# Patient Record
Sex: FEMALE | Race: AMERICAN INDIAN OR ALASKA NATIVE
[De-identification: names, ages, dates, MRNs, and addresses within clinical notes are randomized per-mention and may not be internally consistent; named-entity substitution may affect disease eponyms.]

---

## 2019-01-03 ENCOUNTER — HOSPITAL ENCOUNTER (INPATIENT)
Dept: HOSPITAL 31 - C.ER | Age: 39
LOS: 4 days | Discharge: HOME | DRG: 751 | End: 2019-01-07
Attending: INTERNAL MEDICINE | Admitting: INTERNAL MEDICINE
Payer: MEDICAID

## 2019-01-03 DIAGNOSIS — E78.5: ICD-10-CM

## 2019-01-03 DIAGNOSIS — R55: ICD-10-CM

## 2019-01-03 DIAGNOSIS — F10.230: Primary | ICD-10-CM

## 2019-01-03 DIAGNOSIS — Z79.899: ICD-10-CM

## 2019-01-03 DIAGNOSIS — I10: ICD-10-CM
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
## 2019-01-03 DIAGNOSIS — Z98.891: ICD-10-CM

## 2019-01-03 LAB
ALBUMIN SERPL-MCNC: 4.7 G/DL (ref 3.5–5)
ALBUMIN/GLOB SERPL: 1.3 {RATIO} (ref 1–2.1)
ALT SERPL-CCNC: 45 U/L (ref 9–52)
ANISOCYTOSIS BLD QL SMEAR: SLIGHT
AST SERPL-CCNC: 90 U/L (ref 14–36)
BASOPHILS # BLD AUTO: 0 K/UL (ref 0–0.2)
BASOPHILS NFR BLD: 0.6 % (ref 0–2)
BUN SERPL-MCNC: 5 MG/DL (ref 7–17)
CALCIUM SERPL-MCNC: 8.7 MG/DL (ref 8.6–10.4)
EOSINOPHIL # BLD AUTO: 0 K/UL (ref 0–0.7)
EOSINOPHIL NFR BLD: 0.1 % (ref 0–4)
ERYTHROCYTE [DISTWIDTH] IN BLOOD BY AUTOMATED COUNT: 21.3 % (ref 11.5–14.5)
GFR NON-AFRICAN AMERICAN: > 60
HGB BLD-MCNC: 10.1 G/DL (ref 11–16)
HYPOCHROMIC: (no result)
LIPASE: 114 U/L (ref 23–300)
LYMPHOCYTE: 4 % (ref 20–40)
LYMPHOCYTES # BLD AUTO: 0.7 K/UL (ref 1–4.3)
LYMPHOCYTES NFR BLD AUTO: 9.3 % (ref 20–40)
MCH RBC QN AUTO: 24.2 PG (ref 27–31)
MCHC RBC AUTO-ENTMCNC: 31 G/DL (ref 33–37)
MCV RBC AUTO: 78.2 FL (ref 81–99)
MICROCYTES BLD QL SMEAR: SLIGHT
MONOCYTE: 3 % (ref 0–10)
MONOCYTES # BLD: 0.5 K/UL (ref 0–0.8)
MONOCYTES NFR BLD: 6.1 % (ref 0–10)
NEUTROPHILS # BLD: 6.5 K/UL (ref 1.8–7)
NEUTROPHILS NFR BLD AUTO: 83.9 % (ref 50–75)
NEUTROPHILS NFR BLD AUTO: 93 % (ref 50–75)
NRBC BLD AUTO-RTO: 0 % (ref 0–2)
PLATELET # BLD EST: NORMAL 10*3/UL
PLATELET # BLD: 253 K/UL (ref 130–400)
PMV BLD AUTO: 10.1 FL (ref 7.2–11.7)
POLYCHROMIC: SLIGHT
RBC # BLD AUTO: 4.17 MIL/UL (ref 3.8–5.2)
TOTAL CELLS COUNTED BLD: 100
WBC # BLD AUTO: 7.8 K/UL (ref 4.8–10.8)

## 2019-01-03 NOTE — RAD
HISTORY:

 syncope 



COMPARISON:

Chest x-ray performed 6/13/15 



TECHNIQUE:

Chest PA and lateral



FINDINGS:





LUNGS:

No focal consolidation.



Please note that chest x-ray has limited sensitivity for the 

detection of pulmonary masses.



PLEURA:

No significant pleural effusion identified. No definite pneumothorax .



CARDIOVASCULAR:

Heart size appears within normal limits.  No atherosclerotic 

calcification present.



OSSEOUS STRUCTURES:

No acute osseous abnormality identified.



VISUALIZED UPPER ABDOMEN:

Unremarkable.



OTHER FINDINGS:

None.



IMPRESSION:

No focal consolidation.

## 2019-01-03 NOTE — C.PDOC
History Of Present Illness


 38 yr old female w/ hx of htn, hld, etoh abuse p/w syncopal epsiode. Pt notes 

that she was working as a  when she felt dizzy and passed out. She

notes that she has been dizzy at work, and that the last time she drank was 

yesterday. She notes drinking 6+ cans of beer a day. She denies biting her 

tongue or enuresis and denies any hx of etoh withdrawal seizures. No fever, 

chills or night sweats. 





Time Seen by Provider: 19 16:58


Chief Complaint (Nursing): GI Problem





Past Medical History


Vital Signs: 





                                Last Vital Signs











Temp  98.2 F   19 16:15


 


Pulse  85   19 16:41


 


Resp  16   19 16:41


 


BP  199/99 H  19 16:41


 


Pulse Ox  100   19 16:41














- Medical History


PMH: Cardia Arrhythmia, HTN


   Denies: Chronic Kidney Disease


Family History: States: Unknown Family Hx





- Social History


Hx Tobacco Use: No


Hx Alcohol Use: Yes


Hx Substance Use: No





- Immunization History


Hx Tetanus Toxoid Vaccination: Yes ()


Hx Influenza Vaccination: Yes


Hx Pneumococcal Vaccination: No





Review Of Systems


Constitutional: Positive for: Chills.  Negative for: Fever


Eyes: Negative for: Pain, Vision Change, Eyelid Inflammation, Redness


ENT: Negative for: Ear Pain, Ear Discharge, Nose Congestion, Mouth Pain


Cardiovascular: Negative for: Chest Pain, Palpitations, Edema


Respiratory: Negative for: Cough, Shortness of Breath, SOB with Excertion, 

Pleuritic Pain


Gastrointestinal: Negative for: Nausea, Vomiting, Abdominal Pain, Diarrhea, 

Constipation


Genitourinary: Negative for: Dysuria


Musculoskeletal: Negative for: Neck Pain, Shoulder Pain, Back Pain, Foot Pain


Psych: Positive for: Anxiety





Physical Exam





- Physical Exam


Appears: Well, Non-toxic


Skin: Normal Color, Warm


Head: Atraumatic, Normacephalic


Eye(s): bilateral: Normal Inspection, PERRL, EOMI


Ear(s): Bilateral: Normal


Nose: Normal


Tongue: Normal Appearing


Lips: Normal Appearing


Teeth: Normal Dentition


Throat: Normal, No Erythema, No Exudate


Neck: Normal, Normal ROM, Other (no meningeal signs)


Chest: Symmetrical, No Deformity


Cardiovascular: Rhythm Regular


Respiratory: Normal Breath Sounds, No Rales, No Rhonchi


Gastrointestinal/Abdominal: Normal Exam, Soft, No Tenderness


Back: Normal Inspection, No CVA Tenderness, No Vertebral Tenderness


Extremity: Normal ROM, No Tenderness


Extremity: Bilateral: Atraumatic, Normal Color And Temperature, Normal ROM, 

Pelvis-Stable


Pulses: Left Dorsalis Pedis: Normal, Right Dorsalis Pedis: Normal


Neurological/Psych: Oriented x3, Normal Speech, Normal Cognition


Other Neurological Findings: Other (tongue fasisculations, tremors noted on 

exams to distal fingertips)


Extremity: Right: No Drift, Left: No Drift





ED Course And Treatment





- Laboratory Results


Result Diagrams: 


                                 19 17:28





                                 19 17:28


O2 Sat by Pulse Oximetry: 100





Medical Decision Making


Medical Decision Makin yr old F w/ hx of Etoh abuse p/w syncopal episodes, nausea, tremors. Pt  

notes symptoms began after she d/c etoh last night. No hx of withdrawal or 

seizures but has never d/c etoh. Likely etoh withdrawal w/ syncopal episode. No 

signs of trauma or meningeal signs on exam. No dark or bloody stool. 





CIWA 15





EK, NSR, no stemi





1757


Given home medications for HTN, with improvement, pt denies any HA.


Ct read per my red is neg for ICH


improved shakes and nervousness and fascilations w/ ativan here in ED


admitted to Dr. Gotti (PMD is david) for etoh withdrawal


Pt agreeable to plan 





Disposition





- Disposition


Disposition Time: 17:56


Condition: GOOD


Forms:  [facility] (English)





- Clinical Impression


Clinical Impression: 


 Alcohol withdrawal

## 2019-01-03 NOTE — CT
Date of service: 01/03/2019



PROCEDURE:  CT HEAD WITHOUT CONTRAST.



HISTORY:

syncope



COMPARISON:

Noncontrast head CT performed 3/29/16



TECHNIQUE:

Axial computed tomography images were obtained through the head/brain 

without intravenous contrast.  



Radiation dose:



Total exam DLP = 1109.92 mGy-cm.



This CT exam was performed using one or more of the following dose 

reduction techniques: Automated exposure control, adjustment of the 

mA and/or kV according to patient size, and/or use of iterative 

reconstruction technique.



FINDINGS:



HEMORRHAGE:

No intracranial hemorrhage. 



BRAIN:

No mass effect or edema.  Intracranial atherosclerosis.  Mild 

scattered white matter hypodensities, which are nonspecific, but 

often seen with chronic microvascular ischemic disease. Please note 

that MRI with diffusion imaging is more sensitive in the detection of 

acute ischemic event.



VENTRICLES:

No hydrocephalus. 



CALVARIUM:

Unremarkable.



PARANASAL SINUSES:

Unremarkable as visualized. No significant inflammatory changes.



MASTOID AIR CELLS:

Unremarkable as visualized. No inflammatory changes.



OTHER FINDINGS:

None.



IMPRESSION:

Mild scattered white matter hypodensities, which are nonspecific, but 

often seen with chronic microvascular ischemic disease.  Correlate 

clinically.

## 2019-01-03 NOTE — CT
Date of service: 01/03/2019



CT cervical spine without IV contrast 



Indication: fall



Comparison: None available 



Technique: 



Axial computed tomography images were obtained of the cervical spine 

without the use of intravenous contrast. Coronal and sagittal 

reformatted images were created and reviewed.



This CT exam was performed using 1 or more of the following dose 

reduction techniques: Automated exposure control, adjustment of the 

MAA and/or kV according to patient size, and/or use of iterative 

reconstruction technique. 



Radiation dose:



Total exam DLP = 587.89 mGy-cm.



Findings: 



Straightening of the normal cervical lordosis may be related to 

muscle spasm or positioning. There is no evidence of acute fracture 

or subluxation.  There is preserved alignment, vertebral body height, 

intervertebral disc spaces.  The prevertebral soft tissues and 

spinolaminar lines appear intact. The lateral masses are preserved.  

The dens tip is intact.  There is proper alignment of the lateral 

masses of C1 with the C2 vertebral body. 



Included portions of the thyroid gland appear unremarkable.  Included 

portions of lung apices appear clear. Mucosal thickening of the 

ethmoid air cells and maxillary sinuses. 



Impression: 



Straightening of the normal cervical lordosis may be related to 

muscle spasm or positioning. 



No evidence of acute fracture or subluxation. 



Mucosal thickening of the included portions ethmoid air cells and 

maxillary sinuses.

## 2019-01-04 LAB
ALBUMIN SERPL-MCNC: 4.4 G/DL (ref 3.5–5)
ALBUMIN/GLOB SERPL: 1.2 {RATIO} (ref 1–2.1)
ALT SERPL-CCNC: 42 U/L (ref 9–52)
AST SERPL-CCNC: 63 U/L (ref 14–36)
BASOPHILS # BLD AUTO: 0 K/UL (ref 0–0.2)
BASOPHILS NFR BLD: 0.8 % (ref 0–2)
BUN SERPL-MCNC: 8 MG/DL (ref 7–17)
CALCIUM SERPL-MCNC: 8.9 MG/DL (ref 8.6–10.4)
EOSINOPHIL # BLD AUTO: 0 K/UL (ref 0–0.7)
EOSINOPHIL NFR BLD: 0.2 % (ref 0–4)
ERYTHROCYTE [DISTWIDTH] IN BLOOD BY AUTOMATED COUNT: 21.1 % (ref 11.5–14.5)
GFR NON-AFRICAN AMERICAN: > 60
HGB BLD-MCNC: 9.9 G/DL (ref 11–16)
LYMPHOCYTES # BLD AUTO: 0.8 K/UL (ref 1–4.3)
LYMPHOCYTES NFR BLD AUTO: 16.4 % (ref 20–40)
MCH RBC QN AUTO: 24.8 PG (ref 27–31)
MCHC RBC AUTO-ENTMCNC: 31.3 G/DL (ref 33–37)
MCV RBC AUTO: 79 FL (ref 81–99)
MONOCYTES # BLD: 0.3 K/UL (ref 0–0.8)
MONOCYTES NFR BLD: 6.7 % (ref 0–10)
NEUTROPHILS # BLD: 3.9 K/UL (ref 1.8–7)
NEUTROPHILS NFR BLD AUTO: 75.9 % (ref 50–75)
NRBC BLD AUTO-RTO: 0 % (ref 0–2)
PLATELET # BLD: 236 K/UL (ref 130–400)
PMV BLD AUTO: 9.6 FL (ref 7.2–11.7)
RBC # BLD AUTO: 3.98 MIL/UL (ref 3.8–5.2)
WBC # BLD AUTO: 5.1 K/UL (ref 4.8–10.8)

## 2019-01-04 RX ADMIN — Medication SCH TAB: at 09:10

## 2019-01-04 RX ADMIN — ENOXAPARIN SODIUM SCH MG: 40 INJECTION SUBCUTANEOUS at 09:10

## 2019-01-04 NOTE — CP.PCM.CON
<Kailee Maza - Last Filed: 19 14:54>





History of Present Illness





- History of Present Illness


History of Present Illness: 





Cardiology progress note 


 38 year old female with past medical history of HTN and ETOH abuse is admitted 

for lightheadedness and syncope.  Cardiology consulted for syncope.  Pt states 

that she was working as a  when she felt lightheaded and passed 

out.  Pt denied hitting head, tongue-bitting, incontinence, speech or vision 

changes.  Pt admits to one prior episode of lightheadedness a few months ago 

which she attributes to dehydration.  On admission, CT of head and neck were 

negative.  Currently, pt denies having any lightheadedness, CP, palpitaitons, 

weakness, numbness or tingling.  





PMHx: stated above


Sx: c/S x1


Fam hx: HTN and DM (multiple family members)


Social hx: Admits to socially drinking ETOH, denies illicit drugs or tobacco 

use, lives with family, working


Meds: See MAR 


Allergies: NKDA


PMD: None  





Review of Systems





- Constitutional


Constitutional: absent: Chills, Fever





- EENT


Eyes: absent: Blurred Vision, Change in Vision


Nose/Mouth/Throat: absent: Nasal Congestion





- Cardiovascular


Cardiovascular: Lightheadedness.  absent: Chest Pain, Dyspnea, Leg Edema, Pa

lpitations, Pedal Edema





- Respiratory


Respiratory: absent: Cough, Dyspnea, Wheezing





- Gastrointestinal


Gastrointestinal: absent: Abdominal Pain, Constipation, Diarrhea, Nausea, 

Vomiting





- Musculoskeletal


Musculoskeletal: absent: Back Pain, Numbness, Tingling





- Neurological


Neurological: Syncope.  absent: Confusion, Dizziness, Frequent Falls, Headaches,

Vertigo, Weakness





- Psychiatric


Psychiatric: absent: Anxiety, Depression





Past Patient History





- Past Medical History & Family History


Past Medical History?: Yes





- Past Social History


Smoking Status: Never Smoked





- CARDIAC


Hx Cardiac Disorders: Yes


Hx Cardia Arrhythmia: Yes


Hx Hypertension: Yes





- PULMONARY


Hx Respiratory Disorders: No





- NEUROLOGICAL


Hx Neurological Disorder: No





- HEENT


Hx HEENT Problems: No





- RENAL


Hx Chronic Kidney Disease: No





- ENDOCRINE/METABOLIC


Hx Endocrine Disorders: No





- HEMATOLOGICAL/ONCOLOGICAL


Hx Blood Disorders: No





- INTEGUMENTARY


Hx Dermatological Problems: No





- MUSCULOSKELETAL/RHEUMATOLOGICAL


Hx Musculoskeletal Disorders: No


Hx Falls: Yes





- GASTROINTESTINAL


Hx Gastrointestinal Disorders: No





- GENITOURINARY/GYNECOLOGICAL


Hx Genitourinary Disorders: No





- PSYCHIATRIC


Hx Psychophysiologic Disorder: No


Hx Substance Use: No





- SURGICAL HISTORY


Hx Surgeries: Yes


Hx  Section: Yes ()





- ANESTHESIA


Hx Anesthesia: Yes


Hx Anesthesia Reactions: No


Hx Malignant Hyperthermia: No





Meds


Allergies/Adverse Reactions: 


                                    Allergies











Allergy/AdvReac Type Severity Reaction Status Date / Time


 


No Known Allergies Allergy   Verified 19 16:09














- Medications


Medications: 


                               Current Medications





Aspirin (Aspirin Chewable)  81 mg PO DAILY [facility]


   Last Admin: 19 09:10 Dose:  81 mg


Carvedilol (Coreg)  25 mg PO BID [facility]


   Last Admin: 19 09:10 Dose:  25 mg


Enoxaparin Sodium (Lovenox)  40 mg SC DAILY [facility]


   Last Admin: 19 09:10 Dose:  40 mg


Folic Acid (Folic Acid)  1 mg PO DAILY [facility]


   Last Admin: 19 09:10 Dose:  1 mg


Lisinopril (Zestril)  40 mg PO BID [facility]


   Last Admin: 19 09:28 Dose:  40 mg


Lorazepam (Ativan)  2 mg IVP Q6H PRN


   PRN Reason: alcohol withdrawl


Multivitamins (Hexavitamin)  1 tab PO DAILY [facility]


   Last Admin: 19 09:10 Dose:  1 tab


Thiamine HCl (Vitamin B1 Tab)  100 mg PO BID [facility]


   Last Admin: 19 09:10 Dose:  100 mg











Physical Exam





- Constitutional


Appears: Non-toxic, No Acute Distress





- Head Exam


Head Exam: ATRAUMATIC, NORMOCEPHALIC





- Eye Exam


Eye Exam: EOMI


Pupil Exam: PERRL





- ENT Exam


ENT Exam: Mucous Membranes Moist





- Respiratory Exam


Respiratory Exam: Clear to Auscultation Bilateral.  absent: Rales, Rhonchi, 

Wheezes





- Cardiovascular Exam


Cardiovascular Exam: REGULAR RHYTHM, +S1, +S2, Systolic Murmur.  absent: 

Diastolic murmur, Gallop, Rubs





- GI/Abdominal Exam


GI & Abdominal Exam: Normal Bowel Sounds, Soft.  absent: Firm, Guarding, 

Tenderness





- Extremities Exam


Extremities exam: Negative for: pedal edema, tenderness





- Neurological Exam


Neurological exam: Alert, Oriented x3





- Psychiatric Exam


Psychiatric exam: Normal Affect, Normal Mood





- Skin


Skin Exam: Dry, Intact, Normal Color, Warm





Results





- Vital Signs


Recent Vital Signs: 


                                Last Vital Signs











Temp  98.7 F   19 08:00


 


Pulse  93 H  19 10:00


 


Resp  18   19 10:00


 


BP  145/94 H  19 10:00


 


Pulse Ox  99   19 08:00














- Labs


Result Diagrams: 


                                 19 12:05





                                 19 12:05


Labs: 


                         Laboratory Results - last 24 hr











  19





  16:13 17:28 17:28


 


WBC   7.8  D 


 


RBC   4.17 


 


Hgb   10.1 L 


 


Hct   32.6 L 


 


MCV   78.2 L D 


 


MCH   24.2 L 


 


MCHC   31.0 L 


 


RDW   21.3 H 


 


Plt Count   253 


 


MPV   10.1 


 


Neut % (Auto)   83.9 H 


 


Lymph % (Auto)   9.3 L 


 


Mono % (Auto)   6.1 


 


Eos % (Auto)   0.1 


 


Baso % (Auto)   0.6 


 


Neut # (Auto)   6.5 


 


Lymph # (Auto)   0.7 L 


 


Mono # (Auto)   0.5 


 


Eos # (Auto)   0.0 


 


Baso # (Auto)   0.0 


 


Neutrophils % (Manual)   93 H 


 


Lymphocytes % (Manual)   4 L 


 


Monocytes % (Manual)   3 


 


Platelet Estimate   Normal 


 


Polychromasia   Slight 


 


Hypochromasia (manual)   Moderate 


 


Anisocytosis (manual)   Slight 


 


Microcytosis (manual)   Slight 


 


Sodium    135


 


Potassium    3.5 L


 


Chloride    94 L


 


Carbon Dioxide    28


 


Anion Gap    16


 


BUN    5 L


 


Creatinine    0.5 L


 


Est GFR ( Amer)    > 60


 


Est GFR (Non-Af Amer)    > 60


 


POC Glucose (mg/dL)  116 H  


 


Random Glucose    126 H


 


Calcium    8.7


 


Phosphorus   


 


Magnesium    1.1 L


 


Total Bilirubin    0.6


 


AST    90 H


 


ALT    45


 


Alkaline Phosphatase    74


 


Troponin I   


 


Total Protein    8.4 H


 


Albumin    4.7


 


Globulin    3.7


 


Albumin/Globulin Ratio    1.3


 


Lipase    114


 


Alcohol, Quantitative    < 10














  19





  06:33 12:05 12:05


 


WBC   5.1 


 


RBC   3.98 


 


Hgb   9.9 L 


 


Hct   31.4 L 


 


MCV   79.0 L 


 


MCH   24.8 L 


 


MCHC   31.3 L 


 


RDW   21.1 H 


 


Plt Count   236 


 


MPV   9.6 


 


Neut % (Auto)   75.9 H 


 


Lymph % (Auto)   16.4 L 


 


Mono % (Auto)   6.7 


 


Eos % (Auto)   0.2 


 


Baso % (Auto)   0.8 


 


Neut # (Auto)   3.9 


 


Lymph # (Auto)   0.8 L 


 


Mono # (Auto)   0.3 


 


Eos # (Auto)   0.0 


 


Baso # (Auto)   0.0 


 


Neutrophils % (Manual)   


 


Lymphocytes % (Manual)   


 


Monocytes % (Manual)   


 


Platelet Estimate   


 


Polychromasia   


 


Hypochromasia (manual)   


 


Anisocytosis (manual)   


 


Microcytosis (manual)   


 


Sodium    134


 


Potassium    3.5 L


 


Chloride    94 L


 


Carbon Dioxide    31 H


 


Anion Gap    12


 


BUN    8


 


Creatinine    0.7


 


Est GFR ( Amer)    > 60


 


Est GFR (Non-Af Amer)    > 60


 


POC Glucose (mg/dL)   


 


Random Glucose    151 H


 


Calcium    8.9


 


Phosphorus    3.2


 


Magnesium    1.4 L


 


Total Bilirubin    0.7


 


AST    63 H D


 


ALT    42


 


Alkaline Phosphatase    58


 


Troponin I  0.0170   < 0.0120


 


Total Protein    8.1


 


Albumin    4.4


 


Globulin    3.7


 


Albumin/Globulin Ratio    1.2


 


Lipase   


 


Alcohol, Quantitative   














Assessment & Plan





- Assessment and Plan (Free Text)


Assessment: 





38 year old female with past medical history of HTN and ETOH abuse is admitted 

for syncope.  CT of head and neck were negative on admission.  Troponins x 2 

negative and EKG was NSR.  





Syncope


- Will check echo and carotid US


- Will check orthostatics





HTN


- Continue home meds: lisinopril 40 mg po bid and coreg 25 mg po bid 





ETOH


- Per primary care team 





Case discussed with attending, Dr. Noyola 





- Date & Time


Date: 19


Time: 15:02





<Sree Noyola - Last Filed: 19 07:35>





Meds





- Medications


Medications: 


                               Current Medications





Aspirin (Aspirin Chewable)  81 mg PO DAILY [facility]


   Last Admin: 19 09:10 Dose:  81 mg


Carvedilol (Coreg)  25 mg PO BID [facility]


   Last Admin: 19 17:28 Dose:  25 mg


Enoxaparin Sodium (Lovenox)  40 mg SC DAILY [facility]


   Last Admin: 19 09:10 Dose:  40 mg


Folic Acid (Folic Acid)  1 mg PO DAILY [facility]


   Last Admin: 19 09:10 Dose:  1 mg


Lisinopril (Zestril)  40 mg PO BID [facility]


   Last Admin: 19 17:28 Dose:  40 mg


Lorazepam (Ativan)  2 mg IVP Q6H PRN


   PRN Reason: alcohol withdrawl


   Last Admin: 19 01:32 Dose:  2 mg


Multivitamins (Hexavitamin)  1 tab PO DAILY [facility]


   Last Admin: 19 09:10 Dose:  1 tab


Thiamine HCl (Vitamin B1 Tab)  100 mg PO BID [facility]


   Last Admin: 19 17:28 Dose:  100 mg











Results





- Vital Signs


Recent Vital Signs: 


                                Last Vital Signs











Temp  98.8 F   19 04:00


 


Pulse  77   19 04:00


 


Resp  15   19 04:00


 


BP  153/80 H  19 00:00


 


Pulse Ox  99   19 04:00














- Labs


Result Diagrams: 


                                 19 12:05





                                 19 12:05


Labs: 


                         Laboratory Results - last 24 hr











  19





  12:05 12:05 01:51


 


WBC  5.1  


 


RBC  3.98  


 


Hgb  9.9 L  


 


Hct  31.4 L  


 


MCV  79.0 L  


 


MCH  24.8 L  


 


MCHC  31.3 L  


 


RDW  21.1 H  


 


Plt Count  236  


 


MPV  9.6  


 


Neut % (Auto)  75.9 H  


 


Lymph % (Auto)  16.4 L  


 


Mono % (Auto)  6.7  


 


Eos % (Auto)  0.2  


 


Baso % (Auto)  0.8  


 


Neut # (Auto)  3.9  


 


Lymph # (Auto)  0.8 L  


 


Mono # (Auto)  0.3  


 


Eos # (Auto)  0.0  


 


Baso # (Auto)  0.0  


 


Sodium   134 


 


Potassium   3.5 L 


 


Chloride   94 L 


 


Carbon Dioxide   31 H 


 


Anion Gap   12 


 


BUN   8 


 


Creatinine   0.7 


 


Est GFR ( Amer)   > 60 


 


Est GFR (Non-Af Amer)   > 60 


 


Random Glucose   151 H 


 


Calcium   8.9 


 


Phosphorus   3.2 


 


Magnesium   1.4 L 


 


Total Bilirubin   0.7 


 


AST   63 H D 


 


ALT   42 


 


Alkaline Phosphatase   58 


 


Total Creatine Kinase    84


 


CK-MB (Mass)    0.40


 


Troponin I   < 0.0120  < 0.0120


 


Total Protein   8.1 


 


Albumin   4.4 


 


Globulin   3.7 


 


Albumin/Globulin Ratio   1.2 














Assessment & Plan





- Assessment and Plan (Free Text)


Assessment: 


Patient seen and evaluated with the medical resident. Plan of care discussed and

as documented


ECHO and EKG do not suggest cardiac etiology for passing out. Will follow

## 2019-01-04 NOTE — CP.PCM.HP
History of Present Illness





- History of Present Illness


History of Present Illness: 





 38 yr old female w/ hx of htn, hld, etoh abuse p/w syncopal epsiode. Pt notes 

that she was working as a  when she felt dizzy and passed out. She

notes that she has been dizzy at work, and that the last time she drank was 

yesterday. She notes drinking 6+ cans of beer a day. She denies biting her 

tongue or enuresis and denies any hx of etoh withdrawal seizures. No fever, 

chills or night sweats. 





Present on Admission





- Present on Admission


Any Indicators Present on Admission: No


History of DVT/PE: No


History of Uncontrolled Diabetes: No


Urinary Catheter: No


Decubitus Ulcer Present: No





Review of Systems





- Review of Systems


All systems: reviewed and no additional remarkable complaints except (as 

mentioned in HPI)





Past Patient History





- Past Medical History & Family History


Past Medical History?: Yes





- Past Social History


Smoking Status: Never Smoked





- CARDIAC


Hx Cardiac Disorders: Yes


Hx Cardia Arrhythmia: Yes


Hx Hypertension: Yes





- PULMONARY


Hx Respiratory Disorders: No





- NEUROLOGICAL


Hx Neurological Disorder: No





- HEENT


Hx HEENT Problems: No





- RENAL


Hx Chronic Kidney Disease: No





- ENDOCRINE/METABOLIC


Hx Endocrine Disorders: No





- HEMATOLOGICAL/ONCOLOGICAL


Hx Blood Disorders: No





- INTEGUMENTARY


Hx Dermatological Problems: No





- MUSCULOSKELETAL/RHEUMATOLOGICAL


Hx Musculoskeletal Disorders: No


Hx Falls: Yes





- GASTROINTESTINAL


Hx Gastrointestinal Disorders: No





- GENITOURINARY/GYNECOLOGICAL


Hx Genitourinary Disorders: No





- PSYCHIATRIC


Hx Psychophysiologic Disorder: No


Hx Substance Use: No





- SURGICAL HISTORY


Hx Surgeries: Yes


Hx  Section: Yes ()





- ANESTHESIA


Hx Anesthesia: Yes


Hx Anesthesia Reactions: No


Hx Malignant Hyperthermia: No





Meds


Allergies/Adverse Reactions: 


                                    Allergies











Allergy/AdvReac Type Severity Reaction Status Date / Time


 


No Known Allergies Allergy   Verified 19 16:09














Physical Exam





- Head Exam


Head Exam: NORMAL INSPECTION





- Eye Exam


Eye Exam: Normal appearance





- ENT Exam


ENT Exam: Mucous Membranes Moist





- Respiratory Exam


Respiratory Exam: Clear to Auscultation Bilateral





- Cardiovascular Exam


Cardiovascular Exam: REGULAR RHYTHM, +S1, +S2





- GI/Abdominal Exam


GI & Abdominal Exam: Normal Bowel Sounds, Soft





- Extremities Exam


Extremities exam: Positive for: normal inspection





Results





- Vital Signs


Recent Vital Signs: 





                                Last Vital Signs











Temp  98.7 F   19 08:00


 


Pulse  93 H  19 10:00


 


Resp  18   19 10:00


 


BP  145/94 H  19 10:00


 


Pulse Ox  99   19 08:00














- Labs


Result Diagrams: 


                                 19 12:05





                                 19 12:05


Labs: 





                         Laboratory Results - last 24 hr











  19





  16:13 17:28 17:28


 


WBC   7.8  D 


 


RBC   4.17 


 


Hgb   10.1 L 


 


Hct   32.6 L 


 


MCV   78.2 L D 


 


MCH   24.2 L 


 


MCHC   31.0 L 


 


RDW   21.3 H 


 


Plt Count   253 


 


MPV   10.1 


 


Neut % (Auto)   83.9 H 


 


Lymph % (Auto)   9.3 L 


 


Mono % (Auto)   6.1 


 


Eos % (Auto)   0.1 


 


Baso % (Auto)   0.6 


 


Neut # (Auto)   6.5 


 


Lymph # (Auto)   0.7 L 


 


Mono # (Auto)   0.5 


 


Eos # (Auto)   0.0 


 


Baso # (Auto)   0.0 


 


Neutrophils % (Manual)   93 H 


 


Lymphocytes % (Manual)   4 L 


 


Monocytes % (Manual)   3 


 


Platelet Estimate   Normal 


 


Polychromasia   Slight 


 


Hypochromasia (manual)   Moderate 


 


Anisocytosis (manual)   Slight 


 


Microcytosis (manual)   Slight 


 


Sodium    135


 


Potassium    3.5 L


 


Chloride    94 L


 


Carbon Dioxide    28


 


Anion Gap    16


 


BUN    5 L


 


Creatinine    0.5 L


 


Est GFR ( Amer)    > 60


 


Est GFR (Non-Af Amer)    > 60


 


POC Glucose (mg/dL)  116 H  


 


Random Glucose    126 H


 


Calcium    8.7


 


Phosphorus   


 


Magnesium    1.1 L


 


Total Bilirubin    0.6


 


AST    90 H


 


ALT    45


 


Alkaline Phosphatase    74


 


Troponin I   


 


Total Protein    8.4 H


 


Albumin    4.7


 


Globulin    3.7


 


Albumin/Globulin Ratio    1.3


 


Lipase    114


 


Alcohol, Quantitative    < 10














  19





  06:33 12:05 12:05


 


WBC   5.1 


 


RBC   3.98 


 


Hgb   9.9 L 


 


Hct   31.4 L 


 


MCV   79.0 L 


 


MCH   24.8 L 


 


MCHC   31.3 L 


 


RDW   21.1 H 


 


Plt Count   236 


 


MPV   9.6 


 


Neut % (Auto)   75.9 H 


 


Lymph % (Auto)   16.4 L 


 


Mono % (Auto)   6.7 


 


Eos % (Auto)   0.2 


 


Baso % (Auto)   0.8 


 


Neut # (Auto)   3.9 


 


Lymph # (Auto)   0.8 L 


 


Mono # (Auto)   0.3 


 


Eos # (Auto)   0.0 


 


Baso # (Auto)   0.0 


 


Neutrophils % (Manual)   


 


Lymphocytes % (Manual)   


 


Monocytes % (Manual)   


 


Platelet Estimate   


 


Polychromasia   


 


Hypochromasia (manual)   


 


Anisocytosis (manual)   


 


Microcytosis (manual)   


 


Sodium    134


 


Potassium    3.5 L


 


Chloride    94 L


 


Carbon Dioxide    31 H


 


Anion Gap    12


 


BUN    8


 


Creatinine    0.7


 


Est GFR ( Amer)    > 60


 


Est GFR (Non-Af Amer)    > 60


 


POC Glucose (mg/dL)   


 


Random Glucose    151 H


 


Calcium    8.9


 


Phosphorus    3.2


 


Magnesium    1.4 L


 


Total Bilirubin    0.7


 


AST    63 H D


 


ALT    42


 


Alkaline Phosphatase    58


 


Troponin I  0.0170   < 0.0120


 


Total Protein    8.1


 


Albumin    4.4


 


Globulin    3.7


 


Albumin/Globulin Ratio    1.2


 


Lipase   


 


Alcohol, Quantitative   














Assessment & Plan


(1) Alcohol withdrawal


Status: Acute   





(2) Hypertension


Status: Acute   





(3) Near syncope


Status: Acute   





- Assessment and Plan (Free Text)


Plan: 





Ativan PRN


Cardiology evalv


2 D echo


Coreg


Trend troponins


Thiamine


Folate


DVT/GI prophalaxis

## 2019-01-05 LAB — CK MB SERPL-MCNC: 0.4 NG/ML (ref 0–3.38)

## 2019-01-05 RX ADMIN — Medication SCH TAB: at 09:28

## 2019-01-05 RX ADMIN — ENOXAPARIN SODIUM SCH MG: 40 INJECTION SUBCUTANEOUS at 09:28

## 2019-01-05 NOTE — CP.PCM.PN
Subjective





- Date & Time of Evaluation


Date of Evaluation: 01/05/19


Time of Evaluation: 17:43





- Subjective


Subjective: 





Pt is seen and examined


No events overnight





Objective





- Vital Signs/Intake and Output


Vital Signs (last 24 hours): 


                                        











Temp Pulse Resp BP Pulse Ox


 


 98.6 F   83   19   150/84   97 


 


 01/05/19 12:00  01/05/19 12:00  01/05/19 12:00  01/05/19 17:36  01/05/19 12:00








Intake and Output: 


                                        











 01/05/19 01/05/19





 06:59 18:59


 


Intake Total 100 


 


Balance 100 














- Medications


Medications: 


                               Current Medications





Aspirin (Aspirin Chewable)  81 mg PO DAILY [facility]


   Last Admin: 01/05/19 09:28 Dose:  81 mg


Carvedilol (Coreg)  25 mg PO BID [facility]


   Last Admin: 01/05/19 17:36 Dose:  25 mg


Enoxaparin Sodium (Lovenox)  40 mg SC DAILY [facility]


   Last Admin: 01/05/19 09:28 Dose:  40 mg


Folic Acid (Folic Acid)  1 mg PO DAILY [facility]


   Last Admin: 01/05/19 09:28 Dose:  1 mg


Haloperidol Lactate (Haldol)  0.5 mg IVP Q2H PRN


   PRN Reason: Agitation


Sodium Chloride (Sodium Chloride 0.45%)  1,000 mls @ 80 mls/hr IV .[id number] [facility]


   Last Admin: 01/05/19 16:02 Dose:  80 mls/hr


Lisinopril (Zestril)  40 mg PO BID [facility]


   Last Admin: 01/05/19 17:36 Dose:  40 mg


Lorazepam (Ativan)  2 mg IVP Q6H PRN


   PRN Reason: Anxiety


   Last Admin: 01/05/19 17:37 Dose:  2 mg


Multivitamins (Hexavitamin)  1 tab PO DAILY [facility]


   Last Admin: 01/05/19 09:28 Dose:  1 tab


Thiamine HCl (Vitamin B1 Tab)  100 mg PO BID [facility]


   Last Admin: 01/05/19 17:37 Dose:  100 mg











- Labs


Labs: 


                                        





                                 01/04/19 12:05 





                                 01/04/19 12:05 











- Head Exam


Head Exam: NORMAL INSPECTION





- Eye Exam


Eye Exam: Normal appearance





- ENT Exam


ENT Exam: Mucous Membranes Moist





- Respiratory Exam


Respiratory Exam: Clear to Ausculation Bilateral





- Cardiovascular Exam


Cardiovascular Exam: REGULAR RHYTHM, +S1, +S2





- GI/Abdominal Exam


GI & Abdominal Exam: Soft, Normal Bowel Sounds





- Extremities Exam


Extremities Exam: Normal Inspection





- Neurological Exam


Neurological Exam: Alert, Awake





Assessment and Plan


(1) Alcohol withdrawal


Status: Acute   





(2) ETOH abuse


Status: Acute   





(3) Hypertension


Status: Acute   





(4) Near syncope


Status: Acute   





- Assessment and Plan (Free Text)


Plan: 





Ativan PRN


2 D echo


IVF


Thiamine


Folate


DVT/GI prophalaxis

## 2019-01-05 NOTE — CP.PCM.PN
Subjective





- Date & Time of Evaluation


Date of Evaluation: 01/05/19


Time of Evaluation: 01:26





- Subjective


Subjective: 





PGY-1 Overnight Progress Note





Nurse paged for patient complaining of chest pain.  She complains of burning 

pain in the center of chest.  Patient does appear anxious and upper extremities 

tremulous.  Patient agreed to her prn ativan.  Repeat sets of troponins 

yesterday 1/4 were negative.  Patient is sinus rhythm without noticeable ST 

changes on tele monitor.  Denies history of DVT/PE and denies smoking and OCP 

use, denies dyspnea and shortness of breath.





On exam, patient having mild upper extremity tremors and appears mildly anxi

ous/agitated.  Heart rate is normal range and regular rhythm.  





Repeat EKG showed no ST changes. Repeating ROMIs as well.  PE unlikely.  Suspect

most likely due to anxiety/withdrawl.





Objective





- Vital Signs/Intake and Output


Vital Signs (last 24 hours): 


                                        











Temp Pulse Resp BP Pulse Ox


 


 98.4 F   74   14   153/80 H  99 


 


 01/05/19 00:00  01/05/19 00:00  01/05/19 00:00  01/05/19 00:00  01/05/19 00:00








Intake and Output: 


                                        











 01/04/19 01/05/19





 18:59 06:59


 


Intake Total 850 


 


Balance 850 














- Medications


Medications: 


                               Current Medications





Aspirin (Aspirin Chewable)  81 mg PO DAILY [facility]


   Last Admin: 01/04/19 09:10 Dose:  81 mg


Carvedilol (Coreg)  25 mg PO BID [facility]


   Last Admin: 01/04/19 17:28 Dose:  25 mg


Enoxaparin Sodium (Lovenox)  40 mg SC DAILY [facility]


   Last Admin: 01/04/19 09:10 Dose:  40 mg


Folic Acid (Folic Acid)  1 mg PO DAILY [facility]


   Last Admin: 01/04/19 09:10 Dose:  1 mg


Lisinopril (Zestril)  40 mg PO BID [facility]


   Last Admin: 01/04/19 17:28 Dose:  40 mg


Lorazepam (Ativan)  2 mg IVP Q6H PRN


   PRN Reason: alcohol withdrawl


Multivitamins (Hexavitamin)  1 tab PO DAILY [facility]


   Last Admin: 01/04/19 09:10 Dose:  1 tab


Thiamine HCl (Vitamin B1 Tab)  100 mg PO BID [facility]


   Last Admin: 01/04/19 17:28 Dose:  100 mg











- Labs


Labs: 


                                        





                                 01/04/19 12:05 





                                 01/04/19 12:05

## 2019-01-05 NOTE — CP.PCM.PN
Subjective





- Date & Time of Evaluation


Date of Evaluation: 01/05/19


Time of Evaluation: 13:05





- Subjective


Subjective: 


Patient seen and evaluated


Denies chest pain and dyspnea





Review of Systems





- Constitutional


Constitutional: absent: Chills, Fever





- EENT


Eyes: absent: Blurred Vision, Change in Vision


Nose/Mouth/Throat: absent: Nasal Congestion





- Cardiovascular


Cardiovascular: Lightheadedness.  absent: Chest Pain, Dyspnea, Leg Edema, 

Palpitations, Pedal Edema





- Respiratory


Respiratory: absent: Cough, Dyspnea, Wheezing





- Gastrointestinal


Gastrointestinal: absent: Abdominal Pain, Constipation, Diarrhea, Nausea, 

Vomiting





- Musculoskeletal


Musculoskeletal: absent: Back Pain, Numbness, Tingling





- Neurological


Neurological: Syncope.  absent: Confusion, Dizziness, Frequent Falls, Headaches,

Vertigo, Weakness





- Psychiatric


Psychiatric: absent: Anxiety, Depression





Physical Exam





- Constitutional


Appears: Non-toxic, No Acute Distress





- Head Exam


Head Exam: ATRAUMATIC, NORMOCEPHALIC





- Eye Exam


Eye Exam: EOMI


Pupil Exam: PERRL





- ENT Exam


ENT Exam: Mucous Membranes Moist





- Respiratory Exam


Respiratory Exam: Clear to Auscultation Bilateral.  absent: Rales, Rhonchi, 

Wheezes





- Cardiovascular Exam


Cardiovascular Exam: REGULAR RHYTHM, +S1, +S2, Systolic Murmur.  absent: 

Diastolic murmur, Gallop, Rubs





- GI/Abdominal Exam


GI & Abdominal Exam: Normal Bowel Sounds, Soft.  absent: Firm, Guarding, 

Tenderness





- Extremities Exam


Extremities exam: Negative for: pedal edema, tenderness





- Neurological Exam


Neurological exam: Alert, Oriented x3





- Psychiatric Exam


Psychiatric exam: Normal Affect, Normal Mood





- Skin


Skin Exam: Dry, Intact, Normal Color, Warm





Assessment & Plan





- Assessment and Plan (Free Text)


Assessment: 





38 year old female with past medical history of HTN and ETOH abuse is admitted 

for syncope.  CT of head and neck were negative on admission.  Troponins x 2 

negative and EKG was NSR.  





Syncope


No cardiac etiology for syncope found





HTN


- Continue home meds: lisinopril 40 mg po bid and coreg 25 mg po bid 





ETOH


- Per primary care team 








Objective





- Vital Signs/Intake and Output


Vital Signs (last 24 hours): 


                                        











Temp Pulse Resp BP Pulse Ox


 


 97.3 F L  85   20   150/84   97 


 


 01/05/19 16:00  01/05/19 16:00  01/05/19 16:00  01/05/19 17:36  01/05/19 12:00








Intake and Output: 


                                        











 01/05/19 01/05/19





 06:59 18:59


 


Intake Total 100 


 


Balance 100 














- Medications


Medications: 


                               Current Medications





Aspirin (Aspirin Chewable)  81 mg PO DAILY [facility]


   Last Admin: 01/05/19 09:28 Dose:  81 mg


Carvedilol (Coreg)  25 mg PO BID [facility]


   Last Admin: 01/05/19 17:36 Dose:  25 mg


Enoxaparin Sodium (Lovenox)  40 mg SC DAILY [facility]


   Last Admin: 01/05/19 09:28 Dose:  40 mg


Folic Acid (Folic Acid)  1 mg PO DAILY [facility]


   Last Admin: 01/05/19 09:28 Dose:  1 mg


Haloperidol Lactate (Haldol)  0.5 mg IVP Q2H PRN


   PRN Reason: Agitation


Sodium Chloride (Sodium Chloride 0.45%)  1,000 mls @ 80 mls/hr IV .[id number] [facility]


   Last Admin: 01/05/19 16:02 Dose:  80 mls/hr


Lisinopril (Zestril)  40 mg PO BID [facility]


   Last Admin: 01/05/19 17:36 Dose:  40 mg


Lorazepam (Ativan)  2 mg IVP Q6H PRN


   PRN Reason: Anxiety


   Last Admin: 01/05/19 17:37 Dose:  2 mg


Multivitamins (Hexavitamin)  1 tab PO DAILY [facility]


   Last Admin: 01/05/19 09:28 Dose:  1 tab


Thiamine HCl (Vitamin B1 Tab)  100 mg PO BID [facility]


   Last Admin: 01/05/19 17:37 Dose:  100 mg











- Labs


Labs: 


                                        





                                 01/04/19 12:05 





                                 01/04/19 12:05

## 2019-01-06 VITALS — OXYGEN SATURATION: 100 %

## 2019-01-06 RX ADMIN — Medication SCH TAB: at 10:30

## 2019-01-06 RX ADMIN — ENOXAPARIN SODIUM SCH MG: 40 INJECTION SUBCUTANEOUS at 10:31

## 2019-01-06 NOTE — CARD
--------------- APPROVED REPORT --------------





Date of service: 01/04/2019



EXAM: Two-dimensional and M-mode echocardiogram with Doppler and 

color Doppler.



Other Information 

Quality : GoodRhythm : 



INDICATION

Syncope 



RISK FACTORS

Hypertension 



2D DIMENSIONS 

IVSd1.4   (0.7-1.1cm)LVDd4.6   (3.9-5.9cm)

PWd1.3   (0.7-1.1cm)LA [id number]   (18-58mL)

LVDs2.8   (2.5-4.0cm)FS (%) 39.9   %

LVEF (%)70.6   (>50%)LVEF ([facility])52.80 %



M-Mode DIMENSIONS 

Left Atrium (MM)3.94   (2.5-4.0cm)IVSd1.69   (0.7-1.1cm)

Aortic Root3.05   (2.2-3.7cm)LVDd2.35   (4.0-5.6cm)

Aortic Cusp Exc.1.91   (1.5-2.0cm)PWd1.41   (0.7-1.1cm)

FS (%) 29   %LVDs3.03   (2.0-3.8cm)

LVEF (%)70   (>50%)



Mitral Valve

MV E Bcukatsl877.9cm/sMV A Dfmszpts31.9cm/sE/A ratio1.5



TDI

Lateral E' Peak V6.87cm/sMedial E' Peak V6.03cm/sE/Lateral E'14.7

E/Medial E'16.7



 LEFT VENTRICLE 

The left ventricle is normal size.

There is mild concentric left ventricular hypertrophy.

The left ventricular function is normal.

The left ventricular ejection fraction is within the normal range.

No regional wall motion abnormalities noted.

Indetrminate 

No left ventricle thrombus noted on this study.

There is no ventricular septal defect visualized.

There is no left ventricular aneurysm. 

There is no mass noted in the left ventricle.



 RIGHT VENTRICLE 

The right ventricle is normal size.

There is normal right ventricular wall thickness.

The right ventricular systolic function is normal.



 ATRIA 

The left atrium size is normal.

The right atrium size is normal.

The interatrial septum is intact with no evidence for an atrial 

septal defect.



 AORTIC VALVE 

The aortic valve is normal in structure and function.

No aortic regurgitation is present. 

There is no aortic valvular stenosis. 

There is no aortic valvular vegetation.



 MITRAL VALVE 

The mitral valve is normal in structure and function.

There is no evidence of mitral valve prolapse.

There is no mitral valve stenosis. 

There is no mitral valve regurgitation noted.



 TRICUSPID VALVE 

The tricuspid valve is normal in structure and function.

There is no tricuspid valve regurgitation noted.

There is no tricuspid valve prolapse or vegetation.

There is no tricuspid valve stenosis. 



 PULMONIC VALVE 

The pulmonary valve is normal in structure and function.

There is no pulmonic valvular regurgitation. 

There is no pulmonic valvular stenosis.



 GREAT VESSELS 

The aortic root is normal in size.

The ascending aorta is normal in size.

The pulmonary artery is normal.

The IVC is normal in size and collapses >50% with inspiration.



 PERICARDIAL EFFUSION 

The pericardium appears normal.

There is no pleural effusion.



<Conclusion>

There is mild concentric left ventricular hypertrophy.

The left ventricular function is normal.

The left ventricular ejection fraction is within the normal range.

No regional wall motion abnormalities noted.

## 2019-01-06 NOTE — CP.PCM.PN
Subjective





- Date & Time of Evaluation


Date of Evaluation: 01/06/19


Time of Evaluation: 19:16





- Subjective


Subjective: 





Patient seen and evaluated


Denies chest pain and dyspnea





Review of Systems





- Constitutional


Constitutional: absent: Chills, Fever





- EENT


Eyes: absent: Blurred Vision, Change in Vision


Nose/Mouth/Throat: absent: Nasal Congestion





- Cardiovascular


Cardiovascular: Lightheadedness.  absent: Chest Pain, Dyspnea, Leg Edema, 

Palpitations, Pedal Edema





- Respiratory


Respiratory: absent: Cough, Dyspnea, Wheezing





- Gastrointestinal


Gastrointestinal: absent: Abdominal Pain, Constipation, Diarrhea, Nausea, 

Vomiting





- Musculoskeletal


Musculoskeletal: absent: Back Pain, Numbness, Tingling





- Neurological


Neurological: Syncope.  absent: Confusion, Dizziness, Frequent Falls, Headaches,

Vertigo, Weakness





- Psychiatric


Psychiatric: absent: Anxiety, Depression





Physical Exam





- Constitutional


Appears: Non-toxic, No Acute Distress





- Head Exam


Head Exam: ATRAUMATIC, NORMOCEPHALIC





- Eye Exam


Eye Exam: EOMI


Pupil Exam: PERRL





- ENT Exam


ENT Exam: Mucous Membranes Moist





- Respiratory Exam


Respiratory Exam: Clear to Auscultation Bilateral.  absent: Rales, Rhonchi, 

Wheezes





- Cardiovascular Exam


Cardiovascular Exam: REGULAR RHYTHM, +S1, +S2, Systolic Murmur.  absent: 

Diastolic murmur, Gallop, Rubs





- GI/Abdominal Exam


GI & Abdominal Exam: Normal Bowel Sounds, Soft.  absent: Firm, Guarding, 

Tenderness





- Extremities Exam


Extremities exam: Negative for: pedal edema, tenderness





- Neurological Exam


Neurological exam: Alert, Oriented x3





- Psychiatric Exam


Psychiatric exam: Normal Affect, Normal Mood





- Skin


Skin Exam: Dry, Intact, Normal Color, Warm





Assessment & Plan





- Assessment and Plan (Free Text)


Assessment: 





38 year old female with past medical history of HTN and ETOH abuse is admitted 

for syncope.  CT of head and neck were negative on admission.  Troponins x 2 

negative and EKG was NSR.  





Syncope


No cardiac etiology for syncope found





HTN


- Continue home meds: lisinopril 40 mg po bid and coreg 25 mg po bid 





ETOH


- Per primary care team 











Objective





- Vital Signs/Intake and Output


Vital Signs (last 24 hours): 


                                        











Temp Pulse Resp BP Pulse Ox


 


 98.3 F   75   16   150/91 H  100 


 


 01/06/19 16:00  01/06/19 16:00  01/06/19 16:00  01/06/19 18:21  01/06/19 16:00








Intake and Output: 


                                        











 01/06/19 01/07/19





 18:59 06:59


 


Intake Total 1740 


 


Balance 1740 














- Medications


Medications: 


                               Current Medications





Aspirin (Aspirin Chewable)  81 mg PO DAILY [facility]


   Last Admin: 01/06/19 10:29 Dose:  81 mg


Carvedilol (Coreg)  25 mg PO BID [facility]


   Last Admin: 01/06/19 18:21 Dose:  25 mg


Chlordiazepoxide (Librium)  25 mg PO Q8 [facility]


   Last Admin: 01/06/19 15:35 Dose:  25 mg


Enoxaparin Sodium (Lovenox)  40 mg SC DAILY [facility]


   Last Admin: 01/06/19 10:31 Dose:  40 mg


Folic Acid (Folic Acid)  1 mg PO DAILY [facility]


   Last Admin: 01/06/19 10:30 Dose:  1 mg


Haloperidol Lactate (Haldol)  0.5 mg IVP Q2H PRN


   PRN Reason: Agitation


Lisinopril (Zestril)  40 mg PO BID [facility]


   Last Admin: 01/06/19 18:24 Dose:  40 mg


Lorazepam (Ativan)  2 mg IVP Q6H PRN


   PRN Reason: Anxiety


   Last Admin: 01/05/19 17:37 Dose:  2 mg


Multivitamins (Hexavitamin)  1 tab PO DAILY [facility]


   Last Admin: 01/06/19 10:30 Dose:  1 tab


Thiamine HCl (Vitamin B1 Tab)  100 mg PO BID [facility]


   Last Admin: 01/06/19 18:24 Dose:  100 mg











- Labs


Labs: 


                                        





                                 01/04/19 12:05 





                                 01/04/19 12:05

## 2019-01-06 NOTE — CARD
--------------- APPROVED REPORT --------------





Date of service: 01/03/2019



EKG Measurement

Heart [id number]

NM [id number]

[id number]

[id number]

[id number]



<Conclusion>

Normal sinus rhythm

Incomplete right bundle branch block

Left anterior fascicular block

Minimal voltage criteria for LVH, may be normal variant

Abnormal ECG

## 2019-01-06 NOTE — CP.PCM.PN
Subjective





- Date & Time of Evaluation


Date of Evaluation: 01/06/19


Time of Evaluation: 12:51





- Subjective


Subjective: 





Pt is seen and examined


Awake and alert


No reported confusion since yesterday





Objective





- Vital Signs/Intake and Output


Vital Signs (last 24 hours): 


                                        











Temp Pulse Resp BP Pulse Ox


 


 98.9 F   82   16   162/101 H  99 


 


 01/06/19 12:00  01/06/19 12:00  01/06/19 12:00  01/06/19 12:00  01/06/19 12:00








Intake and Output: 


                                        











 01/06/19 01/06/19





 06:59 18:59


 


Intake Total 1320 


 


Output Total 300 


 


Balance 1020 














- Medications


Medications: 


                               Current Medications





Aspirin (Aspirin Chewable)  81 mg PO DAILY [facility]


   Last Admin: 01/06/19 10:29 Dose:  81 mg


Carvedilol (Coreg)  25 mg PO BID [facility]


   Last Admin: 01/06/19 10:29 Dose:  25 mg


Enoxaparin Sodium (Lovenox)  40 mg SC DAILY [facility]


   Last Admin: 01/06/19 10:31 Dose:  40 mg


Folic Acid (Folic Acid)  1 mg PO DAILY [facility]


   Last Admin: 01/06/19 10:30 Dose:  1 mg


Haloperidol Lactate (Haldol)  0.5 mg IVP Q2H PRN


   PRN Reason: Agitation


Sodium Chloride (Sodium Chloride 0.45%)  1,000 mls @ 80 mls/hr IV .[id number] [facility]


   Last Admin: 01/06/19 03:55 Dose:  80 mls/hr


Lisinopril (Zestril)  40 mg PO BID [facility]


   Last Admin: 01/06/19 10:31 Dose:  40 mg


Lorazepam (Ativan)  2 mg IVP Q6H PRN


   PRN Reason: Anxiety


   Last Admin: 01/05/19 17:37 Dose:  2 mg


Multivitamins (Hexavitamin)  1 tab PO DAILY [facility]


   Last Admin: 01/06/19 10:30 Dose:  1 tab


Thiamine HCl (Vitamin B1 Tab)  100 mg PO BID [facility]


   Last Admin: 01/06/19 10:31 Dose:  100 mg











- Labs


Labs: 


                                        





                                 01/04/19 12:05 





                                 01/04/19 12:05 











- Head Exam


Head Exam: NORMAL INSPECTION





- Eye Exam


Eye Exam: Normal appearance





- ENT Exam


ENT Exam: Mucous Membranes Moist





- Respiratory Exam


Respiratory Exam: Clear to Ausculation Bilateral





- Cardiovascular Exam


Cardiovascular Exam: REGULAR RHYTHM, +S1, +S2





- GI/Abdominal Exam


GI & Abdominal Exam: Soft, Normal Bowel Sounds





- Extremities Exam


Extremities Exam: Normal Inspection





- Neurological Exam


Neurological Exam: Alert, Oriented x3





Assessment and Plan


(1) Alcohol withdrawal


Status: Acute   





(2) Change in mental status


Status: Acute   





(3) Near syncope


Status: Acute   





- Assessment and Plan (Free Text)


Plan: 





d/c IVF


d/c Lorezapem


Start Librium


Thiamine


Folate


DVT/GI prophalaxis

## 2019-01-07 VITALS
DIASTOLIC BLOOD PRESSURE: 72 MMHG | HEART RATE: 78 BPM | TEMPERATURE: 98 F | SYSTOLIC BLOOD PRESSURE: 130 MMHG | RESPIRATION RATE: 17 BRPM

## 2019-01-07 RX ADMIN — Medication SCH TAB: at 10:12

## 2019-01-07 RX ADMIN — ENOXAPARIN SODIUM SCH MG: 40 INJECTION SUBCUTANEOUS at 10:13

## 2019-01-07 NOTE — CON
DATE:  01/05/2019



NEUROLOGY CONSULTATION



REQUESTING PHYSICIAN:  Ca Gotti MD



REASON FOR CONSULTATION:  Syncope.



HISTORY OF PRESENT ILLNESS:  The patient is a 58-year-old lady with past

medical history of hypertension and ethanol abuse.  The patient was

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

consciousness.  No urine incontinence or tongue biting.  No loss of

consciousness or confusion.  At present, the patient is unable to give any

history.  History was taken from her boyfriend at bedside and from the

chart.  Initially, the history was taken by the emergency room notes, and

the patient stated that she had no urinary incontinence.  No confusion. 

Speech was normal.  As per the boyfriend, the patient has been having

episodes of withdrawal after binge drinking for few days, and she gets the

tremors and shaking, and then gradually it subsides, and this is not the

first time as per the boyfriend.



PAST MEDICAL HISTORY:  Hypertension and diabetes.



SOCIAL HISTORY:  Ethanol abuse and binge drinking.  Denied drug abuse. 

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



MEDICATIONS:  Aspirin, lorazepam, carvedilol, folic acid, haloperidol,

multivitamins, enoxaparin.  In addition to thiamine, lisinopril.



FAMILY HISTORY:  Noncontributory.



ALLERGIES:  NO KNOWN ALLERGIC REACTION TO MEDICATIONS.



REVIEW OF SYSTEMS:  As per H and P.  ER notes reviewed.



PHYSICAL EXAMINATION:

VITAL SIGNS:  Blood pressure 130/85, pulse 83, respirations 19, and

temperature 98.6.

MENTAL STATUS:  The patient is sedated, given Ativan 2 mg, but arousable. 

The patient did not recognize her boyfriend, but knew the President was

Ravi.  Did not know the time and disoriented to person.  Did not know the

year and the month, and knew where she lives, the street and the town.

CRANIAL NERVES:  Pupils, symmetrical and reactive.  Positive nystagmus,

bilaterally horizontal.  The patient is not cooperative with full

neurological examination.

MOTOR:  The patient is moving all her extremities spontaneously in response

to noxious stimuli.  Able to lift her arms and legs against gravity.  No

significant tremors at this point, but the patient does get Ativan 2 mg.



DIAGNOSTIC DATA:  CAT scan of the brain did not reveal acute findings. 

Labs reviewed.



IMPRESSION:  Alcohol withdrawal.  The patient's syncope is probably

secondary to withdrawal rather than seizures.  The patient has been on

Ativan 2 mg for the withdrawal in addition to thiamine.  We will do

electroencephalogram.  CAT scan did not reveal significant findings.  Deep

venous thrombosis prophylaxis.  Intravenous hydration.  The patient was not

started on intravenous fluid.  I have started the patient on 80 mL of

half-normal saline.



Thank you for the consultation.





__________________________________________

Rajan Choi MD





DD:  01/05/2019 16:10:15

DT:  01/06/2019 3:08:52

Job # [id number]

## 2019-01-07 NOTE — CP.PCM.DIS
Provider





- Provider


Date of Admission: 


01/03/19 20:10





Attending physician: 


Ca Gotti MD





Consults: 








01/03/19 23:52


Physician Consult Routine 


   Comment: 


   Consulting Provider: Sree Noyola


   Consulting Physician: Sree Noyola


   Reason for Consult: Syncope





01/05/19 07:36


Neurology Consult Routine 


   Comment: Please notify the attending


   Consulting Provider: Ziggy Waller


   Consulting Physician: Ziggy Waller


   Reason for Consult: syncope





01/07/19 09:00


Neurology Consult Routine 


   Comment: DT's etoh abuse  syncope


   Consulting Provider: Heather Jacobo


   Consulting Physician: Heather Jacobo


   Reason for Consult: etoh abuse syncope  DT's














Diagnosis





- Discharge Diagnosis


(1) Alcohol withdrawal


Status: Acute   





(2) ETOH abuse


Status: Acute   





(3) Hypertension


Status: Acute   





(4) Near syncope


Status: Acute   





Hospital Course





- Lab Results


Lab Results: 


                                  Micro Results





01/04/19 09:13   Naris   MRSA Culture (Admit) - Final


                            MRSA NOT DETECTED





                             Most Recent Lab Values











WBC  5.1 K/uL (4.8-10.8)   01/04/19  12:05    


 


RBC  3.98 Mil/uL (3.80-5.20)   01/04/19  12:05    


 


Hgb  9.9 g/dL (11.0-16.0)  L  01/04/19  12:05    


 


Hct  31.4 % (34.0-47.0)  L  01/04/19  12:05    


 


MCV  79.0 fL (81.0-99.0)  L  01/04/19  12:05    


 


MCH  24.8 pg (27.0-31.0)  L  01/04/19  12:05    


 


MCHC  31.3 g/dL (33.0-37.0)  L  01/04/19  12:05    


 


RDW  21.1 % (11.5-14.5)  H  01/04/19  12:05    


 


Plt Count  236 K/uL (130-400)   01/04/19  12:05    


 


MPV  9.6 fL (7.2-11.7)   01/04/19  12:05    


 


Neut % (Auto)  75.9 % (50.0-75.0)  H  01/04/19  12:05    


 


Lymph % (Auto)  16.4 % (20.0-40.0)  L  01/04/19  12:05    


 


Mono % (Auto)  6.7 % (0.0-10.0)   01/04/19  12:05    


 


Eos % (Auto)  0.2 % (0.0-4.0)   01/04/19  12:05    


 


Baso % (Auto)  0.8 % (0.0-2.0)   01/04/19  12:05    


 


Neut # (Auto)  3.9 K/uL (1.8-7.0)   01/04/19  12:05    


 


Lymph # (Auto)  0.8 K/uL (1.0-4.3)  L  01/04/19  12:05    


 


Mono # (Auto)  0.3 K/uL (0.0-0.8)   01/04/19  12:05    


 


Eos # (Auto)  0.0 K/uL (0.0-0.7)   01/04/19  12:05    


 


Baso # (Auto)  0.0 K/uL (0.0-0.2)   01/04/19  12:05    


 


Neutrophils % (Manual)  93 % (50-75)  H  01/03/19  17:28    


 


Lymphocytes % (Manual)  4 % (20-40)  L  01/03/19  17:28    


 


Monocytes % (Manual)  3 % (0-10)   01/03/19  17:28    


 


Platelet Estimate  Normal  (NORMAL)   01/03/19  17:28    


 


Polychromasia  Slight   01/03/19  17:28    


 


Hypochromasia (manual)  Moderate   01/03/19  17:28    


 


Anisocytosis (manual)  Slight   01/03/19  17:28    


 


Microcytosis (manual)  Slight   01/03/19  17:28    


 


Sodium  134 mmol/L (132-148)   01/04/19  12:05    


 


Potassium  3.5 mmol/L (3.6-5.2)  L  01/04/19  12:05    


 


Chloride  94 mmol/L ()  L  01/04/19  12:05    


 


Carbon Dioxide  31 mmol/L (22-30)  H  01/04/19  12:05    


 


Anion Gap  12  (10-20)   01/04/19  12:05    


 


BUN  8 mg/dL (7-17)   01/04/19  12:05    


 


Creatinine  0.7 mg/dL (0.7-1.2)   01/04/19  12:05    


 


Est GFR ( Amer)  > 60   01/04/19  12:05    


 


Est GFR (Non-Af Amer)  > 60   01/04/19  12:05    


 


POC Glucose (mg/dL)  116 mg/dL ()  H  01/03/19  16:13    


 


Random Glucose  151 mg/dL ()  H  01/04/19  12:05    


 


Calcium  8.9 mg/dl (8.6-10.4)   01/04/19  12:05    


 


Phosphorus  3.2 mg/dL (2.5-4.5)   01/04/19  12:05    


 


Magnesium  1.4 mg/dL (1.6-2.3)  L  01/04/19  12:05    


 


Total Bilirubin  0.7 mg/dL (0.2-1.3)   01/04/19  12:05    


 


AST  63 U/L (14-36)  H D 01/04/19  12:05    


 


ALT  42 U/L (9-52)   01/04/19  12:05    


 


Alkaline Phosphatase  58 U/L ()   01/04/19  12:05    


 


Total Creatine Kinase  84 U/L ()   01/05/19  01:51    


 


CK-MB (Mass)  0.40 ng/mL (0.0-3.38)   01/05/19  01:51    


 


Troponin I  < 0.0120 ng/mL (0.00-0.120)   01/05/19  01:51    


 


Total Protein  8.1 g/dL (6.3-8.3)   01/04/19  12:05    


 


Albumin  4.4 g/dL (3.5-5.0)   01/04/19  12:05    


 


Globulin  3.7 gm/dL (2.2-3.9)   01/04/19  12:05    


 


Albumin/Globulin Ratio  1.2  (1.0-2.1)   01/04/19  12:05    


 


Lipase  114 U/L ()   01/03/19  17:28    


 


Alcohol, Quantitative  < 10 mg/dl (0-10)   01/03/19  17:28    














Discharge Exam





- Head Exam


Head Exam: NORMAL INSPECTION





Discharge Plan





- Follow Up Plan


Condition: GOOD


Disposition: HOME/ ROUTINE

## 2019-01-07 NOTE — CARD
--------------- APPROVED REPORT --------------





Date of service: 01/05/2019



EKG Measurement

Heart [id number]

VT [id number]

[id number]

[id number]

[id number]



<Conclusion>

Normal sinus rhythm

Left axis deviation

Abnormal ECG

## 2019-01-09 NOTE — EEG
DATE:  01/07/2019



_____ #:  [id number]



This is a 16-channel electroencephalogram of awake and drowsy adult. 

During the study, photic stimulation was performed.  Hyperventilation was

not performed.



The resting electroencephalogram consists of low amplitude past high beta

activities noted in bilateral, parietal, and occipital leads.  Anteriorly

high amplitude delta activity is noted in bilateral cortical leads. 

Intermittent movement artifact contaminated the background rhythm.  Later,

these activities somewhat organized to form high alpha activities at the

parietal and occipital leads.  Some movement artifacts contaminated the

background rhythm intermittently.  Later, these activities slowed down to

form high amplitude 2 to 3 Hz delta activities seen besides _____.  The

photic stimulation did not evoke driving response noted at 2 to 20 Hz.



IMPRESSION:  Normal electroencephalogram of awake and drowsy adult.  During

the study, neither electroencephalographic paroxysmal activities nor focal

slowing noted.







__________________________________________

Ziggy Waller MD





DD:  01/08/2019 21:33:21

DT:  01/09/2019 0:33:14

Job # [id number]

## 2019-04-20 ENCOUNTER — HOSPITAL ENCOUNTER (OUTPATIENT)
Dept: HOSPITAL 31 - C.ER | Age: 39
Setting detail: OBSERVATION
LOS: 2 days | Discharge: HOME | End: 2019-04-22
Attending: INTERNAL MEDICINE | Admitting: INTERNAL MEDICINE
Payer: COMMERCIAL

## 2019-04-20 VITALS — BODY MASS INDEX: 43.4 KG/M2
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
## 2019-04-20 DIAGNOSIS — F10.10: ICD-10-CM

## 2019-04-20 DIAGNOSIS — R55: Primary | ICD-10-CM

## 2019-04-20 DIAGNOSIS — R07.9: ICD-10-CM

## 2019-04-20 DIAGNOSIS — R00.2: ICD-10-CM

## 2019-04-20 DIAGNOSIS — I10: ICD-10-CM

## 2019-04-20 DIAGNOSIS — E11.9: ICD-10-CM

## 2019-04-20 LAB
ALBUMIN SERPL-MCNC: 4.6 G/DL (ref 3.5–5)
ALBUMIN/GLOB SERPL: 1.2 {RATIO} (ref 1–2.1)
ALT SERPL-CCNC: 53 U/L (ref 9–52)
AMYLASE SERPL-CCNC: 241 U/L (ref 30–110)
AST SERPL-CCNC: 145 U/L (ref 14–36)
BACTERIA #/AREA URNS HPF: (no result) /[HPF]
BASOPHILS # BLD AUTO: 0 K/UL (ref 0–0.2)
BASOPHILS NFR BLD: 0.2 % (ref 0–2)
BILIRUB UR-MCNC: (no result) MG/DL
BNP SERPL-MCNC: 358 PG/ML (ref 0–450)
BUN SERPL-MCNC: 13 MG/DL (ref 7–17)
CALCIUM SERPL-MCNC: 9.8 MG/DL (ref 8.6–10.4)
EOSINOPHIL # BLD AUTO: 0 K/UL (ref 0–0.7)
EOSINOPHIL NFR BLD: 0.1 % (ref 0–4)
ERYTHROCYTE [DISTWIDTH] IN BLOOD BY AUTOMATED COUNT: 21.3 % (ref 11.5–14.5)
GFR NON-AFRICAN AMERICAN: 50
GLUCOSE UR STRIP-MCNC: NORMAL MG/DL
HGB BLD-MCNC: 11.1 G/DL (ref 11–16)
HYALINE CASTS #/AREA URNS LPF: >20 /LPF (ref 0–2)
LEUKOCYTE ESTERASE UR-ACNC: (no result) LEU/UL
LIPASE: 227 U/L (ref 23–300)
LYMPHOCYTES # BLD AUTO: 0.8 K/UL (ref 1–4.3)
LYMPHOCYTES NFR BLD AUTO: 13 % (ref 20–40)
MCH RBC QN AUTO: 24.6 PG (ref 27–31)
MCHC RBC AUTO-ENTMCNC: 31.5 G/DL (ref 33–37)
MCV RBC AUTO: 78.1 FL (ref 81–99)
MONOCYTES # BLD: 0.5 K/UL (ref 0–0.8)
MONOCYTES NFR BLD: 7.7 % (ref 0–10)
NEUTROPHILS # BLD: 5 K/UL (ref 1.8–7)
NEUTROPHILS NFR BLD AUTO: 79 % (ref 50–75)
NRBC BLD AUTO-RTO: 0 % (ref 0–2)
PH UR STRIP: 5 [PH] (ref 5–8)
PLATELET # BLD: 199 K/UL (ref 130–400)
PMV BLD AUTO: 10.6 FL (ref 7.2–11.7)
PROT UR STRIP-MCNC: (no result) MG/DL
RBC # BLD AUTO: 4.52 MIL/UL (ref 3.8–5.2)
RBC # UR STRIP: (no result) /UL
SP GR UR STRIP: 1.02 (ref 1–1.03)
SQUAMOUS EPITHIAL: 16 /HPF (ref 0–5)
UROBILINOGEN UR-MCNC: 2 MG/DL (ref 0.2–1)
WBC # BLD AUTO: 6.3 K/UL (ref 4.8–10.8)

## 2019-04-20 PROCEDURE — 81001 URINALYSIS AUTO W/SCOPE: CPT

## 2019-04-20 PROCEDURE — 96374 THER/PROPH/DIAG INJ IV PUSH: CPT

## 2019-04-20 PROCEDURE — 83880 ASSAY OF NATRIURETIC PEPTIDE: CPT

## 2019-04-20 PROCEDURE — 82948 REAGENT STRIP/BLOOD GLUCOSE: CPT

## 2019-04-20 PROCEDURE — 71045 X-RAY EXAM CHEST 1 VIEW: CPT

## 2019-04-20 PROCEDURE — 87086 URINE CULTURE/COLONY COUNT: CPT

## 2019-04-20 PROCEDURE — 85025 COMPLETE CBC W/AUTO DIFF WBC: CPT

## 2019-04-20 PROCEDURE — 76705 ECHO EXAM OF ABDOMEN: CPT

## 2019-04-20 PROCEDURE — 80053 COMPREHEN METABOLIC PANEL: CPT

## 2019-04-20 PROCEDURE — 80320 DRUG SCREEN QUANTALCOHOLS: CPT

## 2019-04-20 PROCEDURE — 83690 ASSAY OF LIPASE: CPT

## 2019-04-20 PROCEDURE — 81025 URINE PREGNANCY TEST: CPT

## 2019-04-20 PROCEDURE — 99285 EMERGENCY DEPT VISIT HI MDM: CPT

## 2019-04-20 PROCEDURE — 82150 ASSAY OF AMYLASE: CPT

## 2019-04-20 PROCEDURE — 85378 FIBRIN DEGRADE SEMIQUANT: CPT

## 2019-04-20 PROCEDURE — 84484 ASSAY OF TROPONIN QUANT: CPT

## 2019-04-20 PROCEDURE — 93017 CV STRESS TEST TRACING ONLY: CPT

## 2019-04-20 PROCEDURE — 36415 COLL VENOUS BLD VENIPUNCTURE: CPT

## 2019-04-20 PROCEDURE — 93005 ELECTROCARDIOGRAM TRACING: CPT

## 2019-04-20 PROCEDURE — 78452 HT MUSCLE IMAGE SPECT MULT: CPT

## 2019-04-20 RX ADMIN — CIPROFLOXACIN SCH MLS/HR: 2 INJECTION, SOLUTION INTRAVENOUS at 20:31

## 2019-04-20 NOTE — CP.PCM.HP
Past Patient History





- Infectious Disease


Hx of Infectious Diseases: None





- Past Medical History & Family History


Past Medical History?: Yes





- Past Social History


Smoking Status: Never Smoked





- CARDIAC


Hx Cardia Arrhythmia: Yes


Hx Hypertension: Yes





- PULMONARY


Hx Respiratory Disorders: No





- NEUROLOGICAL


Hx Neurological Disorder: No





- HEENT


Hx HEENT Problems: No





- RENAL


Hx Chronic Kidney Disease: No





- ENDOCRINE/METABOLIC


Hx Diabetes Mellitus Type 2: Yes





- HEMATOLOGICAL/ONCOLOGICAL


Hx Blood Disorders: No





- INTEGUMENTARY


Hx Dermatological Problems: No





- MUSCULOSKELETAL/RHEUMATOLOGICAL


Hx Musculoskeletal Disorders: No


Hx Falls: Yes





- GASTROINTESTINAL


Hx Gastrointestinal Disorders: No





- GENITOURINARY/GYNECOLOGICAL


Hx Genitourinary Disorders: No





- PSYCHIATRIC


Hx Substance Use: No





- SURGICAL HISTORY


Hx Surgeries: Yes


Hx  Section: Yes ()





- ANESTHESIA


Hx Anesthesia: Yes


Hx Anesthesia Reactions: No


Hx Malignant Hyperthermia: No





Meds


Allergies/Adverse Reactions: 


                                    Allergies











Allergy/AdvReac Type Severity Reaction Status Date / Time


 


No Known Allergies Allergy   Verified 19 16:03














Results





- Vital Signs


Recent Vital Signs: 





                                Last Vital Signs











Temp  98 F   19 19:04


 


Pulse  77   19 19:04


 


Resp  20   19 19:04


 


BP  144/81   19 19:04


 


Pulse Ox  100   19 19:04














- Labs


Result Diagrams: 


                                 19 16:35





                                 19 16:35


Labs: 





                         Laboratory Results - last 24 hr











  19





  16:35 16:35 16:37


 


WBC  6.3  


 


RBC  4.52  


 


Hgb  11.1  


 


Hct  35.3  


 


MCV  78.1 L  


 


MCH  24.6 L  


 


MCHC  31.5 L  


 


RDW  21.3 H  


 


Plt Count  199  


 


MPV  10.6  


 


Neut % (Auto)  79.0 H  


 


Lymph % (Auto)  13.0 L  


 


Mono % (Auto)  7.7  


 


Eos % (Auto)  0.1  


 


Baso % (Auto)  0.2  


 


Neut # (Auto)  5.0  


 


Lymph # (Auto)  0.8 L  


 


Mono # (Auto)  0.5  


 


Eos # (Auto)  0.0  


 


Baso # (Auto)  0.0  


 


D-Dimer, Quantitative    < 200


 


Sodium   132 


 


Potassium   3.9 


 


Chloride   93 L 


 


Carbon Dioxide   26 


 


Anion Gap   17 


 


BUN   13 


 


Creatinine   1.2 


 


Est GFR ( Amer)   > 60 


 


Est GFR (Non-Af Amer)   50 


 


Random Glucose   140 H 


 


Calcium   9.8 


 


Total Bilirubin   0.9 


 


AST   145 H D 


 


ALT   53 H D 


 


Alkaline Phosphatase   68 


 


Troponin I   0.0190 


 


NT-Pro-B Natriuret Pep   358 


 


Total Protein   8.5 H 


 


Albumin   4.6 


 


Globulin   3.9 


 


Albumin/Globulin Ratio   1.2 


 


Amylase   241 H 


 


Lipase   227 


 


Urine Color   


 


Urine Clarity   


 


Urine pH   


 


Ur Specific Gravity   


 


Urine Protein   


 


Urine Glucose (UA)   


 


Urine Ketones   


 


Urine Blood   


 


Urine Nitrate   


 


Urine Bilirubin   


 


Urine Urobilinogen   


 


Ur Leukocyte Esterase   


 


Urine WBC (Auto)   


 


Urine RBC (Auto)   


 


Ur Squamous Epith Cells   


 


Urine Bacteria   


 


Hyaline Casts   














  19





  17:22


 


WBC 


 


RBC 


 


Hgb 


 


Hct 


 


MCV 


 


MCH 


 


MCHC 


 


RDW 


 


Plt Count 


 


MPV 


 


Neut % (Auto) 


 


Lymph % (Auto) 


 


Mono % (Auto) 


 


Eos % (Auto) 


 


Baso % (Auto) 


 


Neut # (Auto) 


 


Lymph # (Auto) 


 


Mono # (Auto) 


 


Eos # (Auto) 


 


Baso # (Auto) 


 


D-Dimer, Quantitative 


 


Sodium 


 


Potassium 


 


Chloride 


 


Carbon Dioxide 


 


Anion Gap 


 


BUN 


 


Creatinine 


 


Est GFR ( Amer) 


 


Est GFR (Non-Af Amer) 


 


Random Glucose 


 


Calcium 


 


Total Bilirubin 


 


AST 


 


ALT 


 


Alkaline Phosphatase 


 


Troponin I 


 


NT-Pro-B Natriuret Pep 


 


Total Protein 


 


Albumin 


 


Globulin 


 


Albumin/Globulin Ratio 


 


Amylase 


 


Lipase 


 


Urine Color  Nellie


 


Urine Clarity  Hazy


 


Urine pH  5.0


 


Ur Specific Gravity  1.023


 


Urine Protein  2+ H


 


Urine Glucose (UA)  Normal


 


Urine Ketones  Trace


 


Urine Blood  2+ H


 


Urine Nitrate  Negative


 


Urine Bilirubin  1+ H


 


Urine Urobilinogen  2.0 H


 


Ur Leukocyte Esterase  3+ H


 


Urine WBC (Auto)  23 H


 


Urine RBC (Auto)  9 H


 


Ur Squamous Epith Cells  16 H


 


Urine Bacteria  Occ H


 


Hyaline Casts  >20 H

## 2019-04-20 NOTE — RAD
Date of service: 



04/20/2019



PROCEDURE:  CHEST RADIOGRAPH, 1 VIEW



HISTORY:

SOB



COMPARISON:

01/03/2019.



FINDINGS:



LUNGS:





The lungs are well inflated and clear.



PLEURA:

No pneumothorax or pleural effusion.



CARDIOVASCULAR:

The heart is normal in size.  No aortic atherosclerotic 

calcifications present. 



OSSEOUS STRUCTURES:

Within normal limits for the patient's age.



VISUALIZED UPPER ABDOMEN:

Normal.



OTHER FINDINGS:

None. 



IMPRESSION:

No active pulmonary disease.

## 2019-04-20 NOTE — C.PDOC
History Of Present Illness





38 year old female with PMHx of diabetes presents for evaluation of palpitations

and chest discomfort associated with SOB, diarrhea, and vomiting. The pt reports

vomiting and diarrhea for a few days and then developed palpitations and chest 

discomfort while lying down last night. Today she was standing and developed 

chest pain, palpitations and almost passed out.  She notes needing to sit up 

from bed due to chest discomfort. Admits to taking lasix for leg swelling. 

Denies fever, chills, blood in stool/vomit, and any other associated symptoms. 





Time Seen by Provider: 19 15:49


History Per: Patient


History/Exam Limitations: no limitations


Onset/Duration Of Symptoms: Days


Current Symptoms Are (Timing): Still Present


Recent travel outside of the [place]: No





Past Medical History


Reviewed: Historical Data, Nursing Documentation, Vital Signs


Vital Signs: 





                                Last Vital Signs











Temp  98.2 F   19 15:53


 


Pulse  125 H  19 15:53


 


Resp  20   19 15:53


 


BP  128/83   19 15:53


 


Pulse Ox  100   19 15:53














- Medical History


PMH: Cardia Arrhythmia, HTN


   Denies: Chronic Kidney Disease


Family History: States: Unknown Family Hx





- Social History


Hx Tobacco Use: No


Hx Alcohol Use: Yes


Hx Substance Use: No





- Immunization History


Hx Tetanus Toxoid Vaccination: Yes ()


Hx Influenza Vaccination: Yes


Hx Pneumococcal Vaccination: No





Review Of Systems


Except As Marked, All Systems Reviewed And Found Negative.


Constitutional: Negative for: Fever, Chills


Cardiovascular: Positive for: Palpitations (with chest discomfort. )


Respiratory: Positive for: Shortness of Breath


Gastrointestinal: Positive for: Vomiting, Diarrhea


Genitourinary: Negative for: Hematuria, Other (hematemesis. )





Physical Exam





- Physical Exam


Appears: Non-toxic, No Acute Distress, Other (obese. )


Skin: Warm, Dry


Head: Atraumatic, Normacephalic


Eye(s): bilateral: Normal Inspection


Oral Mucosa: Moist


Neck: Normal ROM, Supple


Chest: Symmetrical, No Deformity


Cardiovascular: Rhythm Regular, No Murmur


Respiratory: Normal Breath Sounds, No Rales, No Rhonchi, No Wheezing


Gastrointestinal/Abdominal: Normal Exam, Soft, No Tenderness


Extremity: Bilateral: Atraumatic, Normal Color And Temperature, Normal ROM


Neurological/Psych: Oriented x3, Normal Speech, Normal Cognition





ED Course And Treatment





- Laboratory Results


Result Diagrams: 


                                 19 16:35





                                 19 16:35


ECG: Interpreted By Me, Viewed By Me


ECG Rhythm: Sinus Rhythm


Interpretation Of ECG: boarderline ST changes, QT normal.


Rate From EC


O2 Sat by Pulse Oximetry: 100 (RA)


Pulse Ox Interpretation: Normal





- Radiology


CXR: Read By Radiologist





- Other Rad


  ** CXR


X-Ray: Viewed By Me, Read By Radiologist


Interpretation: FINDINGS:  LUNGS:  The lungs are well inflated and clear.  

PLEURA:  No pneumothorax or pleural effusion.  CARDIOVASCULAR:  The heart is 

normal in size.  No aortic atherosclerotic calcifications present.  OSSEOUS 

STRUCTURES:  Within normal limits for the patient's age.  VISUALIZED UPPER 

ABDOMEN:  Normal.  OTHER FINDINGS:  None.  IMPRESSION:  No active pulmonary 

disease.


Progress Note: Dr Gotti was paged/texted at 17:29


Reassessment Condition: Improved





Medical Decision Making


Medical Decision Making: 





Initial plan: 


-EKG 


-Blood sent. 


-D Dimer 


-CXR 


-Zofran 


-HCG Urine 


-O2 via nasal cannula 


-Urinalysis 





Progress/Update: 


Discussed EKG with Dr. Noriega, who stated no STEMI. 








Disposition


Discussed With : Ca Gotti


Doctor Will See Patient In The: Hospital


Counseled Patient/Family Regarding: Studies Performed





- Disposition


Disposition: HOSPITALIZED


Disposition Time: 18:37


Condition: STABLE





- Clinical Impression


Clinical Impression: 


 Near syncope, Chest pain








- Scribe Statement


The provider has reviewed the documentation as recorded by the Scribe (Gem Reynolds)


Provider Attestation: 





All medical record entries made by the Scribe were at my direction and 

personally dictated by me. I have reviewed the chart and agree that the record 

accurately reflects my personal performance of the history, physical exam, 

medical decision making, and the department course for this patient. I have also

personally directed, reviewed, and agree with the discharge instructions and 

disposition.

## 2019-04-21 LAB
CK MB SERPL-MCNC: 0.49 NG/ML (ref 0–3.38)
CK MB SERPL-MCNC: 0.63 NG/ML (ref 0–3.38)
TROPONIN I SERPL-MCNC: 0.01 NG/ML (ref 0–0.12)

## 2019-04-21 RX ADMIN — PANTOPRAZOLE SODIUM SCH MG: 40 TABLET, DELAYED RELEASE ORAL at 09:16

## 2019-04-21 RX ADMIN — CIPROFLOXACIN SCH MLS/HR: 2 INJECTION, SOLUTION INTRAVENOUS at 20:48

## 2019-04-21 RX ADMIN — CIPROFLOXACIN SCH MLS/HR: 2 INJECTION, SOLUTION INTRAVENOUS at 06:53

## 2019-04-21 RX ADMIN — FLUTICASONE PROPIONATE SCH SPRAY: 50 SPRAY, METERED NASAL at 09:15

## 2019-04-21 NOTE — US
Date of service: 



04/21/2019



HISTORY:

chest pain



COMPARISON:

None.



TECHNIQUE:

Sonographic evaluation of the right upper quadrant of the abdomen.



FINDINGS:



LIVER:

Measures 15.6 cm in length. There is diffuse increased echogenicity 

of the liver parenchyma.  There is a 2.8 x 1.1 x 3.0 cm hyperechoic 

area without significant vascularity adjacent to the gallbladder 

fossa.  No intrahepatic bile duct dilatation. 



GALLBLADDER:

There are no gallstones, wall thickening or pericholecystic fluid.  

The sonographic Valenzuela's sign is negative. 



COMMON BILE DUCT:

Measures 3.7 mm.  No stones. No dilatation.



PANCREAS:

Unremarkable as visualized. No mass. No ductal dilatation.



RIGHT KIDNEY:

Measures 9.4 cm in length. Normal echogenicity. No calculus, mass, or 

hydronephrosis.



AORTA:

No aneurysmal dilatation.



IVC:

Unremarkable.



OTHER FINDINGS:

None .



IMPRESSION:

Fatty liver.



2.8 x 1.1 x 3.0 cm hyperechoic lesion adjacent the gallbladder fossa 

is not completely characterized on this examination and could 

represent focal fatty infiltration or hemangioma however correlation 

with CT scan of the abdomen without and with intravenous contrast 

with liver protocol is recommended for definitive characterization. 



No cholelithiasis or biliary dilatation.

## 2019-04-21 NOTE — CP.PCM.CON
History of Present Illness





- History of Present Illness


History of Present Illness: 





38 year old female with PMHx of diabetes presents for evaluation of palpitations

and chest discomfort associated with SOB, diarrhea, and vomiting. The pt reports

vomiting and diarrhea for a few days and then developed palpitations and chest 

discomfort while lying down last night. Today she was standing and developed 

chest pain, palpitations and almost passed out.  She notes needing to sit up 

from bed due to chest discomfort. Admits to taking lasix for leg swelling. 

Denies fever, chills, blood in stool/vomit, and any other associated symptoms. 





History Per: Patient


History/Exam Limitations: no limitations


Onset/Duration Of Symptoms: Days


Current Symptoms Are (Timing): Still Present


Recent travel outside of the [place]: No





Past Medical History


Reviewed: Historical Data, Nursing Documentation, Vital Signs


Vital Signs: 





                                Last Vital Signs











Temp  98.2 F   19 15:53


 


Pulse  125 H  19 15:53


 


Resp  20   19 15:53


 


BP  128/83   19 15:53


 


Pulse Ox  100   19 15:53














- Medical History


PMH: Cardia Arrhythmia, HTN


   Denies: Chronic Kidney Disease


Family History: States: Unknown Family Hx





- Social History


Hx Tobacco Use: No


Hx Alcohol Use: Yes


Hx Substance Use: No





- Immunization History


Hx Tetanus Toxoid Vaccination: Yes ()


Hx Influenza Vaccination: Yes


Hx Pneumococcal Vaccination: No





Review Of Systems


Except As Marked, All Systems Reviewed And Found Negative.


Constitutional: Negative for: Fever, Chills


Cardiovascular: Positive for: Palpitations (with chest discomfort. )


Respiratory: Positive for: Shortness of Breath


Gastrointestinal: Positive for: Vomiting, Diarrhea


Genitourinary: Negative for: Hematuria, Other (hematemesis. )





Physical Exam





- Physical Exam


Appears: Non-toxic, No Acute Distress, Other (obese. )


Skin: Warm, Dry


Head: Atraumatic, Normacephalic


Eye(s): bilateral: Normal Inspection


Oral Mucosa: Moist


Neck: Normal ROM, Supple


Chest: Symmetrical, No Deformity


Cardiovascular: Rhythm Regular, No Murmur


Respiratory: Normal Breath Sounds, No Rales, No Rhonchi, No Wheezing


Gastrointestinal/Abdominal: Normal Exam, Soft, No Tenderness


Extremity: Bilateral: Atraumatic, Normal Color And Temperature, Normal ROM


Neurological/Psych: Oriented x3, Normal Speech, Normal Cognition





ED Course And Treatment





- Laboratory Results


Result Diagrams: 


                                        


ECG: Interpreted By Me, Viewed By Me


ECG Rhythm: Sinus Rhythm


Interpretation Of ECG: boarderline ST changes, QT normal.


Rate From EC


O2 Sat by Pulse Oximetry: 100 (RA)


Pulse Ox Interpretation: Normal





- Radiology


CXR: Read By Radiologist





- Other Rad


  ** CXR


X-Ray: Viewed By Me, Read By Radiologist


Interpretation: FINDINGS:  LUNGS:  The lungs are well inflated and clear.  

PLEURA:  No pneumothorax or pleural effusion.  CARDIOVASCULAR:  The heart is 

normal in size.  No aortic atherosclerotic calcifications present.  OSSEOUS 

STRUCTURES:  Within normal limits for the patient's age.  VISUALIZED UPPER 

ABDOMEN:  Normal.  OTHER FINDINGS:  None.  IMPRESSION:  No active pulmonary 

disease.


Progress Note: Dr Gotti was paged/texted at 17:29


Reassessment Condition: Improved





Past Patient History





- Infectious Disease


Hx of Infectious Diseases: None





- Past Medical History & Family History


Past Medical History?: Yes





- Past Social History


Smoking Status: Never Smoked





- CARDIAC


Hx Cardia Arrhythmia: Yes


Hx Hypertension: Yes





- PULMONARY


Hx Respiratory Disorders: No





- NEUROLOGICAL


Hx Neurological Disorder: No





- HEENT


Hx HEENT Problems: No





- RENAL


Hx Chronic Kidney Disease: No





- ENDOCRINE/METABOLIC


Hx Diabetes Mellitus Type 2: Yes





- HEMATOLOGICAL/ONCOLOGICAL


Hx Blood Disorders: No





- INTEGUMENTARY


Hx Dermatological Problems: No





- MUSCULOSKELETAL/RHEUMATOLOGICAL


Hx Musculoskeletal Disorders: No


Hx Falls: Yes





- GASTROINTESTINAL


Hx Gastrointestinal Disorders: No





- GENITOURINARY/GYNECOLOGICAL


Hx Genitourinary Disorders: No





- PSYCHIATRIC


Hx Substance Use: No





- SURGICAL HISTORY


Hx Surgeries: Yes


Hx  Section: Yes ()





- ANESTHESIA


Hx Anesthesia: Yes


Hx Anesthesia Reactions: No


Hx Malignant Hyperthermia: No





Meds


Allergies/Adverse Reactions: 


                                    Allergies











Allergy/AdvReac Type Severity Reaction Status Date / Time


 


No Known Allergies Allergy   Verified 19 16:03














- Medications


Medications: 


                               Current Medications





Aspirin (Aspirin Chewable)  81 mg PO DAILY [facility]


   Last Admin: 19 09:16 Dose:  81 mg


Carvedilol (Coreg)  25 mg PO BID [facility]


   Last Admin: 19 18:37 Dose:  25 mg


Escitalopram Oxalate (Lexapro)  20 mg PO DAILY [facility]


   Last Admin: 19 09:15 Dose:  20 mg


Fluticasone Propionate (Flonase)  1 spr KATY DAILY [facility]


   Last Admin: 19 09:15 Dose:  1 spray


Folic Acid (Folic Acid)  1 mg PO DAILY [facility]


   Last Admin: 19 09:16 Dose:  1 mg


Furosemide (Lasix)  20 mg PO DAILY [facility]


   Last Admin: 19 09:16 Dose:  20 mg


Ciprofloxacin (Cipro 400mg/200ml Dsw)  400 mg in 200 mls @ 133 mls/hr IVPB Q12H 

[facility]; Protocol


   Last Admin: 19 20:48 Dose:  133 mls/hr


Lisinopril (Zestril)  40 mg PO DAILY [facility]


   Last Admin: 19 09:16 Dose:  40 mg


Metformin HCl (Glucophage)  500 mg PO DAILY [facility]


   Last Admin: 19 09:16 Dose:  500 mg


Montelukast Sodium (Singulair)  10 mg PO DAILY [facility]


   Last Admin: 19 09:16 Dose:  10 mg


Pantoprazole Sodium (Protonix Ec Tab)  40 mg PO DAILY [facility]


   Last Admin: 19 09:16 Dose:  40 mg


Trazodone HCl (Desyrel)  100 mg PO HS [facility]


   Last Admin: 19 21:43 Dose:  100 mg











Results





- Vital Signs


Recent Vital Signs: 


                                Last Vital Signs











Temp  98.9 F   19 15:00


 


Pulse  79   19 15:49


 


Resp  20   19 15:00


 


BP  118/74   19 18:37


 


Pulse Ox  100   19 15:00














- Labs


Result Diagrams: 


                                 19 16:35





                                 19 16:35


Labs: 


                         Laboratory Results - last 24 hr











  19





  01:42 08:41 16:59


 


POC Glucose (mg/dL)    122 H


 


Total Creatine Kinase  143 H  125 


 


CK-MB (Mass)  0.63  0.49 


 


Troponin I  0.0140  < 0.0120 














  19





  21:43


 


POC Glucose (mg/dL)  115 H


 


Total Creatine Kinase 


 


CK-MB (Mass) 


 


Troponin I 














Assessment & Plan





- Assessment and Plan (Free Text)


Assessment: 





38 year old female with past medical history of HTN and ETOH abuse is admitted 

for near syncope and chest pain.  


Syncope


- Will carotid US


- Will check orthostatics


Check stress test





HTN


- Continue home meds: lisinopril 40 mg po bid and coreg 25 mg po bid 





ETOH


- Per primary care team

## 2019-04-22 VITALS — HEART RATE: 86 BPM

## 2019-04-22 VITALS
OXYGEN SATURATION: 100 % | RESPIRATION RATE: 18 BRPM | DIASTOLIC BLOOD PRESSURE: 66 MMHG | SYSTOLIC BLOOD PRESSURE: 120 MMHG | TEMPERATURE: 98 F

## 2019-04-22 RX ADMIN — FLUTICASONE PROPIONATE SCH: 50 SPRAY, METERED NASAL at 10:25

## 2019-04-22 RX ADMIN — CIPROFLOXACIN SCH MLS/HR: 2 INJECTION, SOLUTION INTRAVENOUS at 06:46

## 2019-04-22 RX ADMIN — PANTOPRAZOLE SODIUM SCH: 40 TABLET, DELAYED RELEASE ORAL at 10:26

## 2019-04-22 NOTE — CP.PCM.PN
Subjective





- Date & Time of Evaluation


Date of Evaluation: 04/22/19


Time of Evaluation: 11:07





Objective





- Vital Signs/Intake and Output


Vital Signs (last 24 hours): 


                                        











Temp Pulse Resp BP Pulse Ox


 


 98.0 F   86   18   120/66   100 


 


 04/22/19 07:00  04/22/19 07:04  04/22/19 07:00  04/22/19 07:00  04/22/19 07:00








Intake and Output: 


                                        











 04/22/19 04/22/19





 06:59 18:59


 


Intake Total 440 


 


Balance 440 














- Medications


Medications: 


                               Current Medications





Aspirin (Aspirin Chewable)  81 mg PO DAILY [facility]


   Last Admin: 04/22/19 10:25 Dose:  Not Given


Carvedilol (Coreg)  25 mg PO BID [facility]


   Last Admin: 04/22/19 10:25 Dose:  Not Given


Escitalopram Oxalate (Lexapro)  20 mg PO DAILY [facility]


   Last Admin: 04/22/19 10:26 Dose:  Not Given


Fluticasone Propionate (Flonase)  1 spr KATY DAILY [facility]


   Last Admin: 04/22/19 10:25 Dose:  Not Given


Folic Acid (Folic Acid)  1 mg PO DAILY [facility]


   Last Admin: 04/22/19 10:25 Dose:  Not Given


Furosemide (Lasix)  20 mg PO DAILY [facility]


   Last Admin: 04/22/19 10:25 Dose:  Not Given


Ciprofloxacin (Cipro 400mg/200ml Dsw)  400 mg in 200 mls @ 133 mls/hr IVPB Q12H 

[facility]; Protocol


   Last Admin: 04/22/19 06:46 Dose:  133 mls/hr


Lisinopril (Zestril)  40 mg PO DAILY [facility]


   Last Admin: 04/22/19 10:26 Dose:  Not Given


Metformin HCl (Glucophage)  500 mg PO DAILY [facility]


   Last Admin: 04/22/19 10:25 Dose:  Not Given


Montelukast Sodium (Singulair)  10 mg PO DAILY [facility]


   Last Admin: 04/22/19 10:26 Dose:  Not Given


Pantoprazole Sodium (Protonix Ec Tab)  40 mg PO DAILY [facility]


   Last Admin: 04/22/19 10:26 Dose:  Not Given


Trazodone HCl (Desyrel)  100 mg PO HS [facility]


   Last Admin: 04/21/19 21:43 Dose:  100 mg











- Labs


Labs: 


                                        





                                 04/20/19 16:35 





                                 04/20/19 16:35

## 2019-04-22 NOTE — CARD
--------------- APPROVED REPORT --------------





Date of service: 04/20/2019



EKG Measurement

Heart [id number]

[id number]

[id number]

[id number]

[id number]



<Conclusion>

Normal sinus rhythm

Left anterior fascicular block

Abnormal ECG
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
